# Patient Record
Sex: MALE | Race: WHITE
[De-identification: names, ages, dates, MRNs, and addresses within clinical notes are randomized per-mention and may not be internally consistent; named-entity substitution may affect disease eponyms.]

---

## 2021-09-13 ENCOUNTER — HOSPITAL ENCOUNTER (EMERGENCY)
Dept: HOSPITAL 8 - ED | Age: 86
Discharge: HOME | End: 2021-09-13
Payer: COMMERCIAL

## 2021-09-13 VITALS — DIASTOLIC BLOOD PRESSURE: 69 MMHG | SYSTOLIC BLOOD PRESSURE: 108 MMHG

## 2021-09-13 VITALS — HEIGHT: 73 IN | WEIGHT: 205.03 LBS | BODY MASS INDEX: 27.17 KG/M2

## 2021-09-13 DIAGNOSIS — I10: ICD-10-CM

## 2021-09-13 DIAGNOSIS — E11.9: ICD-10-CM

## 2021-09-13 DIAGNOSIS — I48.0: Primary | ICD-10-CM

## 2021-09-13 LAB
ALBUMIN SERPL-MCNC: 3.7 G/DL (ref 3.4–5)
ANION GAP SERPL CALC-SCNC: 6 MMOL/L (ref 5–15)
BASOPHILS # BLD AUTO: 0 X10^3/UL (ref 0–0.1)
BASOPHILS NFR BLD AUTO: 0 % (ref 0–1)
CALCIUM SERPL-MCNC: 9.1 MG/DL (ref 8.5–10.1)
CHLORIDE SERPL-SCNC: 104 MMOL/L (ref 98–107)
CREAT SERPL-MCNC: 1.14 MG/DL (ref 0.7–1.3)
EOSINOPHIL # BLD AUTO: 0.1 X10^3/UL (ref 0–0.4)
EOSINOPHIL NFR BLD AUTO: 2 % (ref 1–7)
ERYTHROCYTE [DISTWIDTH] IN BLOOD BY AUTOMATED COUNT: 14.5 % (ref 9.4–14.8)
LYMPHOCYTES # BLD AUTO: 0.8 X10^3/UL (ref 1–3.4)
LYMPHOCYTES NFR BLD AUTO: 8 % (ref 22–44)
MCH RBC QN AUTO: 33.2 PG (ref 27.5–34.5)
MCHC RBC AUTO-ENTMCNC: 34.1 G/DL (ref 33.2–36.2)
MONOCYTES # BLD AUTO: 0.9 X10^3/UL (ref 0.2–0.8)
MONOCYTES NFR BLD AUTO: 10 % (ref 2–9)
NEUTROPHILS # BLD AUTO: 7.3 X10^3/UL (ref 1.8–6.8)
NEUTROPHILS NFR BLD AUTO: 80 % (ref 42–75)
PLATELET # BLD AUTO: 284 X10^3/UL (ref 130–400)
PMV BLD AUTO: 7.3 FL (ref 7.4–10.4)
RBC # BLD AUTO: 4.38 X10^6/UL (ref 4.38–5.82)
TROPONIN I SERPL-MCNC: < 0.015 NG/ML (ref 0–0.04)

## 2021-09-13 PROCEDURE — 36415 COLL VENOUS BLD VENIPUNCTURE: CPT

## 2021-09-13 PROCEDURE — 84484 ASSAY OF TROPONIN QUANT: CPT

## 2021-09-13 PROCEDURE — 82040 ASSAY OF SERUM ALBUMIN: CPT

## 2021-09-13 PROCEDURE — 85025 COMPLETE CBC W/AUTO DIFF WBC: CPT

## 2021-09-13 PROCEDURE — 93005 ELECTROCARDIOGRAM TRACING: CPT

## 2021-09-13 PROCEDURE — 96374 THER/PROPH/DIAG INJ IV PUSH: CPT

## 2021-09-13 PROCEDURE — 84443 ASSAY THYROID STIM HORMONE: CPT

## 2021-09-13 PROCEDURE — 80048 BASIC METABOLIC PNL TOTAL CA: CPT

## 2021-09-13 PROCEDURE — 83735 ASSAY OF MAGNESIUM: CPT

## 2021-09-13 PROCEDURE — 71045 X-RAY EXAM CHEST 1 VIEW: CPT

## 2022-01-01 ENCOUNTER — APPOINTMENT (OUTPATIENT)
Dept: RADIOLOGY | Facility: MEDICAL CENTER | Age: 87
End: 2022-01-01
Attending: EMERGENCY MEDICINE
Payer: MEDICARE

## 2022-01-01 ENCOUNTER — HOSPITAL ENCOUNTER (EMERGENCY)
Facility: MEDICAL CENTER | Age: 87
End: 2022-08-11
Attending: EMERGENCY MEDICINE
Payer: MEDICARE

## 2022-01-01 VITALS
SYSTOLIC BLOOD PRESSURE: 140 MMHG | DIASTOLIC BLOOD PRESSURE: 82 MMHG | RESPIRATION RATE: 18 BRPM | HEIGHT: 73 IN | BODY MASS INDEX: 29.82 KG/M2 | OXYGEN SATURATION: 91 % | HEART RATE: 80 BPM | TEMPERATURE: 97.8 F | WEIGHT: 225 LBS

## 2022-01-01 DIAGNOSIS — S01.81XA FACIAL LACERATION, INITIAL ENCOUNTER: ICD-10-CM

## 2022-01-01 DIAGNOSIS — S09.90XA CLOSED HEAD INJURY, INITIAL ENCOUNTER: ICD-10-CM

## 2022-01-01 DIAGNOSIS — Z79.01 ANTICOAGULATED: ICD-10-CM

## 2022-01-01 PROCEDURE — 70450 CT HEAD/BRAIN W/O DYE: CPT

## 2022-01-01 PROCEDURE — 90471 IMMUNIZATION ADMIN: CPT

## 2022-01-01 PROCEDURE — 700111 HCHG RX REV CODE 636 W/ 250 OVERRIDE (IP): Performed by: EMERGENCY MEDICINE

## 2022-01-01 PROCEDURE — 99284 EMERGENCY DEPT VISIT MOD MDM: CPT

## 2022-01-01 PROCEDURE — 90715 TDAP VACCINE 7 YRS/> IM: CPT | Performed by: EMERGENCY MEDICINE

## 2022-01-01 RX ADMIN — CLOSTRIDIUM TETANI TOXOID ANTIGEN (FORMALDEHYDE INACTIVATED), CORYNEBACTERIUM DIPHTHERIAE TOXOID ANTIGEN (FORMALDEHYDE INACTIVATED), BORDETELLA PERTUSSIS TOXOID ANTIGEN (GLUTARALDEHYDE INACTIVATED), BORDETELLA PERTUSSIS FILAMENTOUS HEMAGGLUTININ ANTIGEN (FORMALDEHYDE INACTIVATED), BORDETELLA PERTUSSIS PERTACTIN ANTIGEN, AND BORDETELLA PERTUSSIS FIMBRIAE 2/3 ANTIGEN 0.5 ML: 5; 2; 2.5; 5; 3; 5 INJECTION, SUSPENSION INTRAMUSCULAR at 13:55

## 2022-08-11 NOTE — ED PROVIDER NOTES
"ED Provider Note    Scribed for Collin Fuchs M.D. by Lang Kessler. 8/11/2022, 12:41 PM.    Primary care provider: Magdalena Angeles M.D.  Means of arrival: EMS  History obtained from: EMS and Patient  History limited by: None     CHIEF COMPLAINT  Chief Complaint   Patient presents with    T-5000 Head Injury     TBI activation BIB EMS. GLF, +head 1\" laceration L forehead, bleeding controlled PTA. -LOC, +eliquis for afib       HPI  Tank Virk is a 89 y.o. male who presents to the Emergency Department via EMS for evaluation as a code TBI, onset prior to arrival. Per EMS the patient had a ground level fall today, he tripped and fell forward hitting his head. He has associated symptoms of lacerations to the face. He denies any loss of consciousness, extremity pain, or neck pain. He is taking eliquis for atrial fibrillation. He is unsure of when his last tetanus shot was. There are no known alleviating or exacerbating factors.        REVIEW OF SYSTEMS  As above otherwise all other systems are negative    PAST MEDICAL HISTORY   Atrial fibrillation.     SURGICAL HISTORY  patient denies any surgical history    SOCIAL HISTORY  Social History     Tobacco Use    Smoking status: Never    Smokeless tobacco: Never   Vaping Use    Vaping Use: Never used   Substance Use Topics    Alcohol use: Never    Drug use: Never      Social History     Substance and Sexual Activity   Drug Use Never       FAMILY HISTORY  History reviewed. No pertinent family history.    CURRENT MEDICATIONS  Home Medications       Reviewed by Chi Gandara R.N. (Registered Nurse) on 08/11/22 at 1247  Med List Status: Partial     Medication Last Dose Status        Patient Jay Taking any Medications                           ALLERGIES  No Known Allergies    PHYSICAL EXAM  VITAL SIGNS: /84   Pulse 81   Temp 36.5 °C (97.7 °F)   Resp 16   Ht 1.854 m (6' 1\")   Wt 102 kg (225 lb)   SpO2 92%   BMI 29.69 kg/m²     Constitutional: Well " developed, Well nourished, No acute distress, Non-toxic appearance.   HENT: Normocephalic, there is a small laceration to the left forehead and left zygoma, Bilateral external ears normal, oropharynx moist, No oral exudates, Nose normal.   Eyes:conjunctiva is normal, there are no signs of exudate.   Neck: Supple, no meningeal signs.  Lymphatic: No lymphadenopathy noted.   Cardiovascular: Regular rate and rhythm without murmurs gallops or rubs.   Thorax & Lungs: No respiratory distress. Breathing comfortably. Lungs are clear to auscultation bilaterally, there are no wheezes no rales. Chest wall is nontender.  Abdomen: Soft, nontender, nondistended. Bowel sounds are present.   Skin: Small 1 cm laceration to the forehead.  Small 3 mm laceration by the eye. Warm, Dry, No erythema,   Back: No tenderness, No CVA tenderness.  Musculoskeletal: Good range of motion in all major joints. No tenderness to palpation or major deformities noted. Intact distal pulses, no clubbing, no cyanosis, no edema,   Neurologic: Alert & oriented x 3, Moving all extremities. No gross abnormalities.    Psychiatric: Affect normal, Judgment normal, Mood normal.         RADIOLOGY  CT-HEAD W/O   Final Result      1. Cerebral atrophy.   2. White matter lucencies most consistent with small vessel ischemic change versus demyelination or gliosis.   3. Otherwise, Head CT without contrast with no acute findings. No evidence of acute cerebral infarction, hemorrhage or mass lesion.   4. Left forehead soft tissue swelling.           The radiologist's interpretation of all radiological studies have been reviewed by me.    COURSE & MEDICAL DECISION MAKING  Pertinent Labs & Imaging studies reviewed. (See chart for details)    12:41 PM - Patient seen and examined at bedside. Patient will be treated with tetanus-dipth-acell pertussis injection 0.5 mL. Ordered imaging to evaluate his symptoms. The differential diagnoses include but are not limited to:  Intracerebral bleed.     1:04 PM - I reevaluated the patient at bedside. I discussed the patient's diagnostic study results which were normal. I discussed plan for discharge and follow up as outlined below. The patient is stable for discharge at this time and will return for any new or worsening symptoms. Patient verbalizes understanding and support with my plan for discharge.        Decision Making:  Patient presents for evaluation.  The patient is on Eliquis and does have signs of trauma to his head CT scan was done there is no signs of intercerebral injury.  Lacerations were cleaned.  The patient's tetanus was reviewed.  At this point I will give the patient head injury instructions the son was at bedside the patient be discharged in stable condition    The patient will return for new or worsening symptoms and is stable at the time of discharge.      DISPOSITION:  Patient will be discharged home in stable condition.    FOLLOW UP:  Magdalena Angeles M.D.  [address]  [place] [address]  [phone]    Schedule an appointment as soon as possible for a visit   As needed, Return if any symptoms worsen      FINAL IMPRESSION  1. Closed head injury, initial encounter    2. Anticoagulated    3. Facial laceration, initial encounter          I, Lang Kessler (Balaji), am scribing for, and in the presence of, Collin Fuchs M.D..    Electronically signed by: Lang Tracy), 8/11/2022    ICollin M.D. personally performed the services described in this documentation, as scribed by Lang Kessler in my presence, and it is both accurate and complete.    The note accurately reflects work and decisions made by me.  Collin Fuchs M.D.  8/11/2022  7:14 PM

## 2022-08-11 NOTE — ED NOTES
Wound to L side of forehead cleaned and steri strips applied. Pt. Provided with education on how to care for wound. Verbalized understanding.

## 2022-08-11 NOTE — ED TRIAGE NOTES
"Tank Virk  89 y.o. male  Chief Complaint   Patient presents with    T-5000 Head Injury     TBI activation BIB EMS. GLF, +head 1\" laceration L forehead, bleeding controlled PTA. -LOC, +eliquis for afib     Pt BIB EMS for above complaint.    Pt is AAO x 4, GCS 15, speaking in full sentences, follows commands and responds appropriately to questions. Resp are even and unlabored.     TBI protocol ordered from charge desk. Patient to CT, then to [address]. Report to Emma MARADIAGA.    This RN masked and in appropriate PPE during encounter.     Vitals:    08/11/22 1246   BP:    Pulse:    Resp:    Temp: 36.5 °C (97.7 °F)   SpO2:       "

## 2022-08-11 NOTE — ED NOTES
Patient left ED via w/c, with son and in NAD. Verbalized understanding of home monitoring, f/u information and further discharge instructions. Denies further questions at time of discharge.